# Patient Record
Sex: MALE
[De-identification: names, ages, dates, MRNs, and addresses within clinical notes are randomized per-mention and may not be internally consistent; named-entity substitution may affect disease eponyms.]

---

## 2019-01-20 ENCOUNTER — HOSPITAL ENCOUNTER (EMERGENCY)
Dept: HOSPITAL 5 - ED | Age: 34
Discharge: STILL ADMITTED OR ADMINISTRATIVE | End: 2019-01-20
Payer: COMMERCIAL

## 2019-01-20 VITALS — DIASTOLIC BLOOD PRESSURE: 71 MMHG | SYSTOLIC BLOOD PRESSURE: 111 MMHG

## 2019-01-20 DIAGNOSIS — R41.82: ICD-10-CM

## 2019-01-20 DIAGNOSIS — G82.20: ICD-10-CM

## 2019-01-20 DIAGNOSIS — T50.901A: Primary | ICD-10-CM

## 2019-01-20 LAB
ALBUMIN SERPL-MCNC: 3.9 G/DL (ref 3.9–5)
ALT SERPL-CCNC: 34 UNITS/L (ref 7–56)
BENZODIAZEPINES SCREEN,URINE: (no result)
BILIRUB UR QL STRIP: (no result)
BLOOD UR QL VISUAL: (no result)
BUN SERPL-MCNC: 13 MG/DL (ref 9–20)
BUN/CREAT SERPL: 16 %
CALCIUM SERPL-MCNC: 9.6 MG/DL (ref 8.4–10.2)
HCT VFR BLD CALC: 46.4 % (ref 35.5–45.6)
HEMOLYSIS INDEX: 10
HGB BLD-MCNC: 14.7 GM/DL (ref 11.8–15.2)
MCHC RBC AUTO-ENTMCNC: 32 % (ref 32–34)
MCV RBC AUTO: 98 FL (ref 84–94)
METHADONE SCREEN,URINE: (no result)
OPIATE SCREEN,URINE: (no result)
PH UR STRIP: 7 [PH] (ref 5–7)
PLATELET # BLD: 290 K/MM3 (ref 140–440)
RBC # BLD AUTO: 4.74 M/MM3 (ref 3.65–5.03)
UROBILINOGEN UR-MCNC: < 2 MG/DL (ref ?–2)

## 2019-01-20 PROCEDURE — 80053 COMPREHEN METABOLIC PANEL: CPT

## 2019-01-20 PROCEDURE — 82553 CREATINE MB FRACTION: CPT

## 2019-01-20 PROCEDURE — 36415 COLL VENOUS BLD VENIPUNCTURE: CPT

## 2019-01-20 PROCEDURE — 84484 ASSAY OF TROPONIN QUANT: CPT

## 2019-01-20 PROCEDURE — G0480 DRUG TEST DEF 1-7 CLASSES: HCPCS

## 2019-01-20 PROCEDURE — 82550 ASSAY OF CK (CPK): CPT

## 2019-01-20 PROCEDURE — 96360 HYDRATION IV INFUSION INIT: CPT

## 2019-01-20 PROCEDURE — 93010 ELECTROCARDIOGRAM REPORT: CPT

## 2019-01-20 PROCEDURE — 85025 COMPLETE CBC W/AUTO DIFF WBC: CPT

## 2019-01-20 PROCEDURE — 93005 ELECTROCARDIOGRAM TRACING: CPT

## 2019-01-20 PROCEDURE — 81003 URINALYSIS AUTO W/O SCOPE: CPT

## 2019-01-20 PROCEDURE — 70450 CT HEAD/BRAIN W/O DYE: CPT

## 2019-01-20 PROCEDURE — 80320 DRUG SCREEN QUANTALCOHOLS: CPT

## 2019-01-20 PROCEDURE — 99291 CRITICAL CARE FIRST HOUR: CPT

## 2019-01-20 PROCEDURE — 80307 DRUG TEST PRSMV CHEM ANLYZR: CPT
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
## 2019-01-20 NOTE — CAT SCAN REPORT
FINAL REPORT



EXAM:  CT HEAD/BRAIN WO CON



HISTORY:  ams. od 



COMPARISON:  None available. 



TECHNIQUE:  Axial images obtained skull base through vertex.



FINDINGS:  

No acute intracranial hemorrhage, midline shift or pathologic extra axial fluid collection. Ventricle
s and cisterns are normal in size and configuration for the patient's age. Gray-white differentiation
 preserved. Calvarium grossly intact. Visualized ocular globes are grossly unremarkable. Visualized p
brayan-nasal sinuses and mastoid air cells are clear.



IMPRESSION:  

No grossly acute intracranial abnormality.

## 2021-09-15 ENCOUNTER — OUT OF OFFICE VISIT (OUTPATIENT)
Dept: URBAN - METROPOLITAN AREA MEDICAL CENTER 34 | Facility: MEDICAL CENTER | Age: 36
End: 2021-09-15
Payer: MEDICARE

## 2021-09-15 DIAGNOSIS — A41.89 OTHER SPECIFIED SEPSIS: ICD-10-CM

## 2021-09-15 DIAGNOSIS — D50.8 ACQUIRED IRON DEFICIENCY ANEMIA DUE TO DECREASED ABSORPTION: ICD-10-CM

## 2021-09-15 DIAGNOSIS — D50.8 OTHER IRON DEFICIENCY ANEMIA: ICD-10-CM

## 2021-09-15 DIAGNOSIS — Z79.01 ANTICOAGULATED: ICD-10-CM

## 2021-09-15 DIAGNOSIS — Z93.3 COLOSTOMY PRESENT: ICD-10-CM

## 2021-09-15 PROCEDURE — 99232 SBSQ HOSP IP/OBS MODERATE 35: CPT | Performed by: INTERNAL MEDICINE

## 2021-09-15 PROCEDURE — 99222 1ST HOSP IP/OBS MODERATE 55: CPT | Performed by: INTERNAL MEDICINE

## 2021-09-15 PROCEDURE — 99232 SBSQ HOSP IP/OBS MODERATE 35: CPT | Performed by: PHYSICIAN ASSISTANT

## 2021-09-15 PROCEDURE — 99254 IP/OBS CNSLTJ NEW/EST MOD 60: CPT | Performed by: PHYSICIAN ASSISTANT

## 2021-09-15 PROCEDURE — 996 AG2 NON-BILLABLE: Performed by: PHYSICIAN ASSISTANT

## 2021-09-15 PROCEDURE — G8427 DOCREV CUR MEDS BY ELIG CLIN: HCPCS | Performed by: INTERNAL MEDICINE

## 2021-09-22 ENCOUNTER — OUT OF OFFICE VISIT (OUTPATIENT)
Dept: URBAN - METROPOLITAN AREA MEDICAL CENTER 34 | Facility: MEDICAL CENTER | Age: 36
End: 2021-09-22
Payer: MEDICARE

## 2021-09-22 DIAGNOSIS — D50.8 ACQUIRED IRON DEFICIENCY ANEMIA DUE TO DECREASED ABSORPTION: ICD-10-CM

## 2021-09-22 DIAGNOSIS — R19.5 ABNORMAL CONSISTENCY OF STOOL: ICD-10-CM

## 2021-09-22 PROCEDURE — 99233 SBSQ HOSP IP/OBS HIGH 50: CPT | Performed by: INTERNAL MEDICINE

## 2022-02-14 ENCOUNTER — OUT OF OFFICE VISIT (OUTPATIENT)
Dept: URBAN - METROPOLITAN AREA MEDICAL CENTER 16 | Facility: MEDICAL CENTER | Age: 37
End: 2022-02-14
Payer: MEDICARE

## 2022-02-14 DIAGNOSIS — K92.0 BLOODY EMESIS: ICD-10-CM

## 2022-02-14 DIAGNOSIS — D64.89 ANEMIA DUE TO OTHER CAUSE: ICD-10-CM

## 2022-02-14 DIAGNOSIS — D69.6 THROMBOCYTOPENIA: ICD-10-CM

## 2022-02-14 DIAGNOSIS — D68.8 OTHER SPECIFIED COAGULATION DEFECTS: ICD-10-CM

## 2022-02-14 PROCEDURE — G8427 DOCREV CUR MEDS BY ELIG CLIN: HCPCS | Performed by: STUDENT IN AN ORGANIZED HEALTH CARE EDUCATION/TRAINING PROGRAM

## 2022-02-14 PROCEDURE — 99232 SBSQ HOSP IP/OBS MODERATE 35: CPT | Performed by: STUDENT IN AN ORGANIZED HEALTH CARE EDUCATION/TRAINING PROGRAM

## 2022-02-14 PROCEDURE — 99222 1ST HOSP IP/OBS MODERATE 55: CPT | Performed by: STUDENT IN AN ORGANIZED HEALTH CARE EDUCATION/TRAINING PROGRAM
